# Patient Record
Sex: MALE | URBAN - METROPOLITAN AREA
[De-identification: names, ages, dates, MRNs, and addresses within clinical notes are randomized per-mention and may not be internally consistent; named-entity substitution may affect disease eponyms.]

---

## 2018-01-25 ENCOUNTER — EMERGENCY (EMERGENCY)
Facility: HOSPITAL | Age: 23
LOS: 0 days | Discharge: HOME | End: 2018-01-25
Attending: EMERGENCY MEDICINE
Payer: MEDICAID

## 2018-01-25 VITALS
HEART RATE: 92 BPM | WEIGHT: 164.91 LBS | TEMPERATURE: 101 F | RESPIRATION RATE: 16 BRPM | HEIGHT: 73 IN | OXYGEN SATURATION: 100 % | DIASTOLIC BLOOD PRESSURE: 66 MMHG | SYSTOLIC BLOOD PRESSURE: 123 MMHG

## 2018-01-25 DIAGNOSIS — R50.9 FEVER, UNSPECIFIED: ICD-10-CM

## 2018-01-25 DIAGNOSIS — R05 COUGH: ICD-10-CM

## 2018-01-25 DIAGNOSIS — R07.0 PAIN IN THROAT: ICD-10-CM

## 2018-01-25 DIAGNOSIS — J11.1 INFLUENZA DUE TO UNIDENTIFIED INFLUENZA VIRUS WITH OTHER RESPIRATORY MANIFESTATIONS: ICD-10-CM

## 2018-01-25 PROCEDURE — 99283 EMERGENCY DEPT VISIT LOW MDM: CPT

## 2018-01-25 RX ORDER — DEXTROMETHORPHAN HYDROBROMIDE AND PROMETHAZINE HYDROCHLORIDE 15; 6.25 MG/5ML; MG/5ML
5 SYRUP ORAL
Qty: 120 | Refills: 0
Start: 2018-01-25 | End: 2018-01-29

## 2018-01-25 RX ORDER — IBUPROFEN 200 MG
1 TABLET ORAL
Qty: 20 | Refills: 0
Start: 2018-01-25 | End: 2018-01-29

## 2018-01-25 NOTE — ED ADULT NURSE NOTE - OBJECTIVE STATEMENT
Pt is 23 y/o male denies pmh presents with fever, chills, dizziness, and vomiting and lower abdominal pain onset yesterday.

## 2018-04-02 ENCOUNTER — EMERGENCY (EMERGENCY)
Facility: HOSPITAL | Age: 23
LOS: 0 days | Discharge: HOME | End: 2018-04-02
Attending: EMERGENCY MEDICINE
Payer: MEDICAID

## 2018-04-02 VITALS
SYSTOLIC BLOOD PRESSURE: 119 MMHG | TEMPERATURE: 98 F | OXYGEN SATURATION: 99 % | DIASTOLIC BLOOD PRESSURE: 72 MMHG | HEART RATE: 91 BPM | RESPIRATION RATE: 18 BRPM | HEIGHT: 73 IN

## 2018-04-02 DIAGNOSIS — M25.572 PAIN IN LEFT ANKLE AND JOINTS OF LEFT FOOT: ICD-10-CM

## 2018-04-02 DIAGNOSIS — F12.10 CANNABIS ABUSE, UNCOMPLICATED: ICD-10-CM
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
## 2018-04-02 DIAGNOSIS — Z79.1 LONG TERM (CURRENT) USE OF NON-STEROIDAL ANTI-INFLAMMATORIES (NSAID): ICD-10-CM

## 2018-04-02 DIAGNOSIS — M54.5 LOW BACK PAIN: ICD-10-CM

## 2018-04-02 DIAGNOSIS — S39.012A STRAIN OF MUSCLE, FASCIA AND TENDON OF LOWER BACK, INITIAL ENCOUNTER: ICD-10-CM

## 2018-04-02 PROCEDURE — 99282 EMERGENCY DEPT VISIT SF MDM: CPT

## 2018-04-02 NOTE — ED PROVIDER NOTE - OBJECTIVE STATEMENT
Patient has had intermittent left ankle and lower back for past several months; patient denies any trauma or neurovascular deficits

## 2018-04-02 NOTE — ED ADULT NURSE NOTE - OBJECTIVE STATEMENT
pt states " my left ankle has been feeling stiff for the past year , but this week the stiffness has gotten worst. I can barley move it around and it is painful to stand on it."

## 2018-04-02 NOTE — ED PROVIDER NOTE - CARE PLAN
Principal Discharge DX:	Lumbar strain, initial encounter  Secondary Diagnosis:	Left ankle pain, unspecified chronicity

## 2018-04-28 ENCOUNTER — EMERGENCY (EMERGENCY)
Facility: HOSPITAL | Age: 23
LOS: 0 days | Discharge: HOME | End: 2018-04-29
Attending: EMERGENCY MEDICINE
Payer: MEDICAID

## 2018-04-28 VITALS
RESPIRATION RATE: 17 BRPM | DIASTOLIC BLOOD PRESSURE: 71 MMHG | TEMPERATURE: 98 F | WEIGHT: 171.96 LBS | SYSTOLIC BLOOD PRESSURE: 120 MMHG | HEIGHT: 74 IN | HEART RATE: 88 BPM | OXYGEN SATURATION: 100 %
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
## 2018-04-28 DIAGNOSIS — J34.89 OTHER SPECIFIED DISORDERS OF NOSE AND NASAL SINUSES: ICD-10-CM

## 2018-04-28 DIAGNOSIS — S01.21XA LACERATION WITHOUT FOREIGN BODY OF NOSE, INITIAL ENCOUNTER: ICD-10-CM
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
## 2018-04-28 DIAGNOSIS — S01.81XA LACERATION WITHOUT FOREIGN BODY OF OTHER PART OF HEAD, INITIAL ENCOUNTER: ICD-10-CM

## 2018-04-28 DIAGNOSIS — S02.40CA MAXILLARY FRACTURE, RIGHT SIDE, INITIAL ENCOUNTER FOR CLOSED FRACTURE: ICD-10-CM

## 2018-04-28 DIAGNOSIS — S02.81XA FRACTURE OF OTHER SPECIFIED SKULL AND FACIAL BONES, RIGHT SIDE, INITIAL ENCOUNTER FOR CLOSED FRACTURE: ICD-10-CM
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
## 2018-04-28 PROCEDURE — 12011 RPR F/E/E/N/L/M 2.5 CM/<: CPT

## 2018-04-28 PROCEDURE — 76377 3D RENDER W/INTRP POSTPROCES: CPT | Mod: 26

## 2018-04-28 PROCEDURE — 99284 EMERGENCY DEPT VISIT MOD MDM: CPT | Mod: 25

## 2018-04-28 PROCEDURE — 70450 CT HEAD/BRAIN W/O DYE: CPT | Mod: 26

## 2018-04-28 PROCEDURE — 70486 CT MAXILLOFACIAL W/O DYE: CPT | Mod: 26

## 2018-04-28 NOTE — ED PROVIDER NOTE - OBJECTIVE STATEMENT
Pt is a 21 yo gentleman with no significant past medical history who presents to the ED with pain in his nose. He was playing basketball and was elbowed in the face prior to arrival. No head strike, no loc. No sob. Had tdap 1 year ago. Has cut to R. side of nose. No neck pain, no other injuries, no wrist pain.

## 2018-04-28 NOTE — ED PROVIDER NOTE - PROGRESS NOTE DETAILS
Results reported to patient--max sinus and orbital fractures; called [facility], agrees with outpt. f/u monday and antbx  Pt. reports feeling better after meds  pt. agrees to f/u with primary care outpt. as well as plastics   pt. understands to return to ED if symptoms worsen; will d/c  no EOM entrapment on exam--pt. denies double vision

## 2018-04-28 NOTE — ED PROVIDER NOTE - CARE PLAN
Principal Discharge DX:	Orbital fracture, closed, initial encounter  Secondary Diagnosis:	Closed fracture of maxillary sinus, initial encounter Principal Discharge DX:	Orbital fracture, closed, initial encounter  Secondary Diagnosis:	Closed fracture of maxillary sinus, initial encounter  Secondary Diagnosis:	Facial laceration, initial encounter

## 2018-04-28 NOTE — ED PROCEDURE NOTE - CPROC ED TIME OUT STATEMENT1
“Patient's name, , procedure and correct site were confirmed during the [place] Timeout.”
“Patient's name, , procedure and correct site were confirmed during the [place] Timeout.”

## 2018-04-28 NOTE — ED PROVIDER NOTE - MEDICAL DECISION MAKING DETAILS
Ddx: Nasal fx/ No evidence of retrobulbar hematoma  Plan: ct head, ct max/face, laceration repair, referral to ENT as needed.

## 2018-04-28 NOTE — ED PROCEDURE NOTE - CPROC ED POST PROC CARE GUIDE1
Verbal/written post procedure instructions were given to patient/caregiver./Instructed patient/caregiver to follow-up with primary care physician./Instructed patient/caregiver regarding signs and symptoms of infection./Keep the cast/splint/dressing clean and dry.
Verbal/written post procedure instructions were given to patient/caregiver./Instructed patient/caregiver to follow-up with primary care physician./Instructed patient/caregiver regarding signs and symptoms of infection./Keep the cast/splint/dressing clean and dry.

## 2018-04-28 NOTE — ED PROVIDER NOTE - SKIN COLOR
has 2 lacerations on R. side of face, one lateral to nares, 2 cm, another below R. eye 2 cm. Both shallow

## 2018-04-28 NOTE — ED ADULT NURSE NOTE - OBJECTIVE STATEMENT
received ft c/o injury to face got hit by another individuals elbow while playing basketball r orbital swelling and echhymosis with laceration r cheek and swelling to nasal area denies loc denies dizziness no bleeding at present no n/v denies pain at present denies vision disturbance

## 2018-04-29 VITALS
TEMPERATURE: 98 F | DIASTOLIC BLOOD PRESSURE: 68 MMHG | SYSTOLIC BLOOD PRESSURE: 117 MMHG | HEART RATE: 71 BPM | OXYGEN SATURATION: 99 % | RESPIRATION RATE: 18 BRPM

## 2018-04-29 RX ORDER — IBUPROFEN 200 MG
1 TABLET ORAL
Qty: 20 | Refills: 0
Start: 2018-04-29 | End: 2018-05-03

## 2018-04-29 RX ADMIN — Medication 1 TABLET(S): at 01:56

## 2018-05-04 ENCOUNTER — EMERGENCY (EMERGENCY)
Facility: HOSPITAL | Age: 23
LOS: 0 days | Discharge: HOME | End: 2018-05-04
Attending: EMERGENCY MEDICINE

## 2018-05-04 VITALS
TEMPERATURE: 97 F | HEART RATE: 71 BPM | OXYGEN SATURATION: 100 % | RESPIRATION RATE: 16 BRPM | HEIGHT: 73 IN | SYSTOLIC BLOOD PRESSURE: 135 MMHG | WEIGHT: 171.96 LBS | DIASTOLIC BLOOD PRESSURE: 66 MMHG

## 2018-05-04 DIAGNOSIS — Z48.02 ENCOUNTER FOR REMOVAL OF SUTURES: ICD-10-CM

## 2018-05-05 NOTE — ED PROVIDER NOTE - OBJECTIVE STATEMENT
this is a 23 yo m c/o of right cheek suture removal x 1 week. denies nausea, vomiting, fever, blurry vision, sob, Td is utd.

## 2019-07-25 NOTE — ED ADULT NURSE NOTE - CAS DISCH TRANSFER METHOD
Faxed signed order for duplex carotid bilateral to [facility] at [phone] on 07/24/2019 by YARELIS Brown LPN. Paperwork and confirmation has been placed to be scanned into patient's chart.
Private car